# Patient Record
Sex: MALE | URBAN - METROPOLITAN AREA
[De-identification: names, ages, dates, MRNs, and addresses within clinical notes are randomized per-mention and may not be internally consistent; named-entity substitution may affect disease eponyms.]

---

## 2021-09-03 ENCOUNTER — OFFICE VISIT (OUTPATIENT)
Dept: URBAN - METROPOLITAN AREA CLINIC 76 | Facility: CLINIC | Age: 46
End: 2021-09-03
Payer: COMMERCIAL

## 2021-09-03 DIAGNOSIS — H43.812 VITREOUS DEGENERATION, LEFT EYE: ICD-10-CM

## 2021-09-03 DIAGNOSIS — E11.3313 TYPE 2 DIAB W MODERATE NONPRLF DIAB RTNOP W MACULAR EDEMA, BILATERAL: Primary | ICD-10-CM

## 2021-09-03 DIAGNOSIS — H25.13 AGE-RELATED NUCLEAR CATARACT, BILATERAL: ICD-10-CM

## 2021-09-03 PROCEDURE — 92134 CPTRZ OPH DX IMG PST SGM RTA: CPT | Performed by: OPTOMETRIST

## 2021-09-03 PROCEDURE — 99204 OFFICE O/P NEW MOD 45 MIN: CPT | Performed by: OPTOMETRIST

## 2021-09-03 ASSESSMENT — VISUAL ACUITY
OS: 20/50
OD: 20/40

## 2021-09-03 ASSESSMENT — INTRAOCULAR PRESSURE
OD: 15
OS: 15

## 2021-09-03 ASSESSMENT — KERATOMETRY
OS: 44.75
OD: 44.25

## 2021-09-03 NOTE — IMPRESSION/PLAN
Impression: Type 2 diab w moderate nonprlf diab rtnop w macular edema, bilateral: V90.8013. OS>OD

-h/o injections at [facility] about 1 month ago per patient, unable to travel to [place]
-order Boo Aden, reviewed today Plan: Diabetes type II: macular edema OS, moderate background diabetic retinopathy. Patient was instructed on the ocular and systemic benefits of blood sugar control. Continue to monitor blood sugar and continue care with PCP. Continue to monitor for changes. Referred to Dr. Germain Montes.

## 2021-09-03 NOTE — IMPRESSION/PLAN
Impression: Iridodialysis, right eye: H21.531. Traumatic. 

-longstanding and stable per patient. Plan: Discussed condition, continue to observe.

## 2021-09-03 NOTE — IMPRESSION/PLAN
Impression: Age-related nuclear cataract, bilateral: H25.13. Plan: Observe, trace OU. RTC PRN for refraction once stable.

## 2021-11-29 ENCOUNTER — OFFICE VISIT (OUTPATIENT)
Dept: URBAN - METROPOLITAN AREA CLINIC 76 | Facility: CLINIC | Age: 46
End: 2021-11-29
Payer: COMMERCIAL

## 2021-11-29 DIAGNOSIS — E11.3391 TYPE 2 DIAB W MODERATE NONPRLF DIAB RTNOP W/O MACULAR EDEMA, RIGHT EYE: ICD-10-CM

## 2021-11-29 DIAGNOSIS — H21.531 IRIDODIALYSIS, RIGHT EYE: ICD-10-CM

## 2021-11-29 DIAGNOSIS — E11.3512 TYPE 2 DIABETES MELLITUS W/ PROLIFERATIVE DIABETIC RETINOPATHY W/ MACULAR EDEMA, LEFT EYE: ICD-10-CM

## 2021-11-29 DIAGNOSIS — H43.12 VITREOUS HEMORRHAGE, LEFT EYE: Primary | ICD-10-CM

## 2021-11-29 PROCEDURE — 99204 OFFICE O/P NEW MOD 45 MIN: CPT | Performed by: OPHTHALMOLOGY

## 2021-11-29 PROCEDURE — 92134 CPTRZ OPH DX IMG PST SGM RTA: CPT | Performed by: OPHTHALMOLOGY

## 2021-11-29 ASSESSMENT — INTRAOCULAR PRESSURE
OD: 17
OS: 18

## 2021-11-29 NOTE — IMPRESSION/PLAN
Impression: Type 2 diabetes mellitus w/ proliferative diabetic retinopathy w/ macular edema, left eye: O54.1706 Left. Plan: OCT ordered and performed today. The clinical exam is consistent with proliferative diabetic retinopathy. Discussed diagnosis with patient.

## 2021-11-29 NOTE — IMPRESSION/PLAN
Impression: Type 2 diab w moderate nonprlf diab rtnop w/o macular edema, right eye: G05.1932 Right. Plan: OCT ordered and performed today. Discussed diagnosis with patient. The clinical exam was consistent with Type 2 diabetes. The patient was advised to maintain tight blood sugar control, blood pressure and lipid control.

## 2021-11-29 NOTE — IMPRESSION/PLAN
Impression: Vitreous hemorrhage, left eye: H43.12 Left. PDR OS Plan: OCT ordered and performed today. Discussed with the patient his treatment options, Recommend intravitreal injections and PRP laser treatment, the patient understands the R/b/a's of each treatment. The patient understands the risks of observation. The patient would like to see Dr. Katie Doty for a MRx and will decide which treatment options he would prefer in the future.